# Patient Record
Sex: FEMALE | Race: WHITE
[De-identification: names, ages, dates, MRNs, and addresses within clinical notes are randomized per-mention and may not be internally consistent; named-entity substitution may affect disease eponyms.]

---

## 2017-04-03 ENCOUNTER — HOSPITAL ENCOUNTER (OUTPATIENT)
Dept: HOSPITAL 18 - NAV RAD | Age: 37
Discharge: HOME | End: 2017-04-03
Payer: COMMERCIAL

## 2017-04-03 DIAGNOSIS — J02.9: Primary | ICD-10-CM

## 2017-04-03 PROCEDURE — 71020: CPT

## 2017-04-04 NOTE — RAD
PA AND LATERAL VIEWS CHEST:

 

HISTORY:

Viral pharyngitis.

 

FINDINGS:

The cardiomediastinum is normal.  The lungs are expanded and clear.  The bony thorax is normal.

 

IMPRESSION:  

Normal exam.

 

POS: [id number]

## 2021-01-07 ENCOUNTER — HOSPITAL ENCOUNTER (EMERGENCY)
Dept: HOSPITAL 18 - NAV ERS | Age: 41
Discharge: HOME | End: 2021-01-07
Payer: COMMERCIAL
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
## 2021-01-07 DIAGNOSIS — S01.01XA: Primary | ICD-10-CM

## 2021-01-07 PROCEDURE — 12001 RPR S/N/AX/GEN/TRNK 2.5CM/<: CPT

## 2021-01-07 PROCEDURE — 70450 CT HEAD/BRAIN W/O DYE: CPT
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
